# Patient Record
Sex: MALE | Race: WHITE | NOT HISPANIC OR LATINO | Employment: OTHER | URBAN - METROPOLITAN AREA
[De-identification: names, ages, dates, MRNs, and addresses within clinical notes are randomized per-mention and may not be internally consistent; named-entity substitution may affect disease eponyms.]

---

## 2019-01-01 ENCOUNTER — CONVERSION ENCOUNTER (OUTPATIENT)
Dept: OTHER | Facility: HOSPITAL | Age: 59
End: 2019-01-01

## 2019-01-01 VITALS
DIASTOLIC BLOOD PRESSURE: 82 MMHG | SYSTOLIC BLOOD PRESSURE: 137 MMHG | BODY MASS INDEX: 35.03 KG/M2 | OXYGEN SATURATION: 94 % | HEART RATE: 73 BPM | HEIGHT: 66 IN | WEIGHT: 218 LBS

## 2019-01-01 RX ORDER — CLOPIDOGREL BISULFATE 75 MG/1
75 TABLET ORAL DAILY
Qty: 90 TABLET | Refills: 3 | Status: SHIPPED | OUTPATIENT
Start: 2019-01-01

## 2019-06-06 NOTE — PROGRESS NOTES
Visit Type:  Follow-up Visit  Primary Care Provider:  Sarah Juares NP @ [facility]      History of Present Illness:    Dear Sarah,    Mr. Olson is a 59-year-old gentleman with history of severe single-vessel coronary artery disease of the LAD status post PCI with stenting June of 2016 with 70% ostial LAD lesion and FFR negative.  Additional history of hypertension with hypertensive   cardiovascular disease, mild cardiomyopathy with EF of 45% and COPD.    Patient presents today after evaluation for abnormal radiographic findings of right carotid concerning for occlusion that was performed by a dentist in preparation for full dental   extraction and dentures.    Today, the patient reports no complaints of chest pain, pressure or tightness.  His respiratory status is baseline for him.    He denies dizziness, lightheadedness, vision changes, paresis, numbness or tingling in extremities.    Patient has been off   Plavix since June 2017-his last office visit- at his insistence despite my recommendation to continue given his residual cardiac disease.   patient stated he “talk to a doctor at the hospital who said he did not need it”.    This was revisited again today   secondary to concerns now for carotid disease.  I do not have the actual radiology report, only physician report that  states imaging showed possible right carotid occlusion.  EKG shows sinus rhythm with incomplete right bundle branch block -new finding   from prior    Impression  1. Single-vessel coronary artery disease status post PCI with stenting June 2016  2.  Hypertension with hypertensive cardiovascular disease  3. Mild ischemic cardiomyopathy  4.  COPD  5. Anti-platelet therapy  6. abnormal x-ray concerning for right carotid occlusion    Plan   patient agreeable to resume Plavix  Continue aspirin  Will get carotid Dopplers to evaluate for any occlusion  Will get echocardiogram to re-evaluate LV function -last EF 45%  Follow-up after testing      Vital  Signs:    Patient Profile:    59 Years Old Male  Height:     66 inches  Weight:     218 pounds  BMI:        35.18     O2 Sat:     94 %  Inhaled O2: 3 lit/min.  Pulse rate: 73 / minute  Pulse rhythm:   regular  BP Sittin / 82  (left arm)    Cuff size:  large      Problems: Active problems were reviewed with the patient during this visit.  Medications: Medications were reviewed with the patient during this visit.  Allergies: Allergies were reviewed with the patient during this visit.  No Known Allergy.        Vitals Entered By: Lilly Gupta LPN (May 29, 2019 8:25 AM)      Past Medical History:     Reviewed history from 2017 and no changes required:        C O P D , stage 4         Hernia, Ventral         Fatty Neck Tumor        Coronary Artery Disease: s/p PCI     Past Surgical History:     Reviewed history from 2017 and no changes required:        Tonsillectomy        P T C A: x1 2016 [facility]        PCI/Drug-eluting stent: 2016        Umbilical Hernia Repair at [facility] : 2016 by Dr. Werner         Tumor removed from side of neck at [facility] : 2016 by Dr. Werner     Active Medications (reviewed today):  ASPIR-LOW 81 MG ORAL TABLET DELAYED RELEASE (ASPIRIN) Take 1 tablet by mouth daily  INCRUSE ELLIPTA 62.5 MCG/INH INHALATION AEROSOL POWDER BREATH ACTIVATED (UMECLIDINIUM BROMIDE) Take one puff daily  BREO ELLIPTA AEROSOL POWDER BREATH ACTIVATED (FLUTICASONE FUROATE-VILANTEROL AEPB) Take one puff daily  VENTOLIN  (90 Base) MCG/ACT INHALATION AEROSOL SOLUTION (ALBUTEROL SULFATE) every 6 hours or PRN  ISOSORBIDE MONONITRATE ER 30 MG ORAL TABLET EXTENDED RELEASE 24 HOUR (ISOSORBIDE MONONITRATE) Take 1 tablet daily  CLOPIDOGREL BISULFATE 75 MG ORAL TABLET (CLOPIDOGREL BISULFATE) One by mouth daily  ATORVASTATIN CALCIUM 20 MG ORAL TABLET (ATORVASTATIN CALCIUM) p.o.qhs  NITROSTAT 0.4 MG SUBLINGUAL TABLET SUBLINGUAL (NITROGLYCERIN) Dissolve one (1) tablet under the tongue every five minutes as needed  for chest pain. Do Not Exceed 3 tablets.  ZYRTEC ALLERGY 10 MG ORAL TABLET (CETIRIZINE HCL) Take 1 tablet by mouth daily    Current Allergies (reviewed today):  No known allergies    Family History Summary:      Reviewed history Last on 04/12/2017 and no changes required:05/29/2019  Mother - Has Family History of Stroke/CVA - Entered On: 5/29/2019  Father - Has Family History of Lung/Respiratory Disease - COPD - Entered On: 6/23/2016  Mother - Has Family History of Diabetes - Entered On: 6/23/2016  Father - Has Family History of Lung Cancer - Entered On: 6/23/2016  Mother - Has Family History of Other Cancer - Kidney Cancer - Entered On: 6/23/2016  Mother - Has Family History of Kidney Disease - Kidney Cancer - Entered On: 6/23/2016  Father - Has Family History of Heart Disease - Questionable - Entered On: 6/23/2016  Mother - Has Family History of Hypertension - Entered On: 6/23/2016  Mother - Has Family History of Heart Disease - Entered On: 6/23/2016      Social History:     Reviewed history from 04/12/2017 and no changes required:        Passive smoke exposure - no        Alcohol Use - no        Drug Use - no        Regular Exercise - no                Smoking History:        Patient is a former smoker.  (uses e-cigs now)                 Children: 4        Employed: [employer]/ now disabled         Risk Factors:     Smoked Tobacco Use:  Former smoker     Cigarettes:  Yes -- 1 pack(s) per day,      Year quit:  3-4 yrs ago  Smokeless Tobacco Use:  Current       Items -- 1 vial q 2 days per day       Year started:  3-4 yrs ago  Passive smoke exposure:  no  Drug use:  no  HIV high-risk behavior:  no  Caffeine use:  3 drinks per day  Alcohol use:  yes     Type:  occasionally - wine , mixed drink   Exercise:  no  Seatbelt use:  100 %  Sun Exposure:  rarely    Family History Risk Factors:     Family History of MI in females < 65 years old:  no     Family History of MI in males < 55 years old:   no        Review of Systems   General: Per HPI  Eyes: denies blurring, diplopia  Cardiovascular: Single-vessel coronary artery disease status post PCI with stenting 6/16.  Residual 70% ostial lesion.  Mild ischemic cardiomyopathy with EF 45%. concern for carotid artery disease from recent imaging  Respiratory: denies excessive sputum, hemoptysis, wheezing.  COPD.  Reports respiratory status at baseline  Gastrointestinal: Denies nausea vomiting, melena or hematochezia  Genitourinary: Denies hematuria  Musculoskeletal: denies back pain, joint pain, joint swelling, muscle cramps, weakness  Skin: Frequent bruising  Neurologic: denies focal neuro deficits  Endocrine: denies cold intolerance, heat intolerance, significant weight gain or loss  Hematologic/Lymphatic: denies abnormal bleeding, easy bruisability      Physical Exam    General:       well-developed, well-nourished 59-year-old  male in no acute distress  Head:      normocephalic and atraumatic.    Eyes:       sclera clear and nonicteric.  Pupils are equal and reactive  Mouth:       mucous membranes are moist, no lesions  Patient is edentulous  Neck:       no audible bruit  Lungs:       diminished but clear bilaterally.  No wheezes rales or rhonchi  Heart:       normal S1 and S2 with regular rate and rhythm.  No murmurs gallops or rubs  Msk:       ambulate slowly with no assistive devices  Pulses:       2+ radial and pedal pulses bilaterally  Extremities:       no edema  Neurologic:      no focal deficits, cranial nerves II-XII grossly intact with normal sensation, reflexes, coordination, muscle strength and tone.    Skin:       no apparent lesions or rashes  Psych:      alert and cooperative; normal mood and affect; normal attention span and concentration.      Diabetes Management Exam:      Foot Exam (with socks and/or shoes not present):        Pulses:            2+ radial and pedal pulses bilaterally      Blood Pressure:  Today's BP: 137/82 mm  Hg          EKG Interpretation   Rhythm: Normal Sinus  Rate: 73  Comments:  incomplete right bundle branch block  Comparison to Prior EKGs: Different      Medications Added to Medication List This Visit:  1)  Vitamin D3 2000 Unit Oral Capsule (Cholecalciferol) .... Take 1 tablet by mouth daily  2)  Trelegy Ellipta 100-62.5-25 Mcg/inh Inhalation Aerosol Powder Breath Activated (Fluticasone-umeclidin-vilant) .... Daily                    Medication Administration    Orders Added:  1)  EKG (In House) [38526]  2)  Ofc Vst, Est Level IV [62956]  3)  2D ECHO W/COLOR FLOW DOPPLER [CPT-03592]  4)  Bilateral Carotid Doppler [CPT-50723]    ]      Electronically signed by Myriam Hua NP on 05/29/2019 at 9:10 AM  ________________________________________________________________________       Disclaimer: Converted Note message may not contain all data elements that existed in the legacy source system. Please see [facility] [facility] for the original note details.

## 2020-01-01 ENCOUNTER — OFFICE VISIT (OUTPATIENT)
Dept: CARDIOLOGY | Facility: CLINIC | Age: 60
End: 2020-01-01

## 2020-01-01 VITALS
OXYGEN SATURATION: 97 % | SYSTOLIC BLOOD PRESSURE: 161 MMHG | HEIGHT: 66 IN | BODY MASS INDEX: 35.52 KG/M2 | HEART RATE: 73 BPM | WEIGHT: 221 LBS | DIASTOLIC BLOOD PRESSURE: 96 MMHG

## 2020-01-01 DIAGNOSIS — R06.09 DYSPNEA ON EXERTION: ICD-10-CM

## 2020-01-01 DIAGNOSIS — Z79.02 LONG TERM CURRENT USE OF ANTITHROMBOTICS/ANTIPLATELETS: ICD-10-CM

## 2020-01-01 DIAGNOSIS — I25.10 CORONARY ARTERY DISEASE DUE TO LIPID RICH PLAQUE: Primary | ICD-10-CM

## 2020-01-01 DIAGNOSIS — J44.9 CHRONIC OBSTRUCTIVE PULMONARY DISEASE, UNSPECIFIED COPD TYPE (HCC): ICD-10-CM

## 2020-01-01 DIAGNOSIS — E78.5 DYSLIPIDEMIA: ICD-10-CM

## 2020-01-01 DIAGNOSIS — I25.83 CORONARY ARTERY DISEASE DUE TO LIPID RICH PLAQUE: Primary | ICD-10-CM

## 2020-01-01 PROCEDURE — 93000 ELECTROCARDIOGRAM COMPLETE: CPT | Performed by: NURSE PRACTITIONER

## 2020-01-01 PROCEDURE — 99213 OFFICE O/P EST LOW 20 MIN: CPT | Performed by: NURSE PRACTITIONER

## 2020-01-01 RX ORDER — ALBUTEROL SULFATE 90 UG/1
2 AEROSOL, METERED RESPIRATORY (INHALATION) EVERY 6 HOURS PRN
COMMUNITY

## 2020-01-01 RX ORDER — NITROGLYCERIN 0.4 MG/1
0.4 TABLET SUBLINGUAL
COMMUNITY

## 2020-01-01 RX ORDER — ATORVASTATIN CALCIUM 20 MG/1
20 TABLET, FILM COATED ORAL DAILY
COMMUNITY

## 2020-01-01 RX ORDER — ASPIRIN 81 MG/1
81 TABLET ORAL DAILY
COMMUNITY

## 2020-01-01 RX ORDER — CETIRIZINE HYDROCHLORIDE 10 MG/1
10 TABLET ORAL DAILY
COMMUNITY

## 2020-02-04 NOTE — PROGRESS NOTES
[facility] CARDIOLOGY      REASON FOR FOLLOW-UP:  Coronary artery disease  History of PCI  Cardiomyopathy  Hypertension with hypertensive cardiovascular disease            Chief Complaint   Patient presents with   • Coronary Artery Disease     6 month f/u   Chest discomfort unrelated to anything specific   • Hypertension         Dear [facility],    History of Present Illness     Mr. Olson is a 59-year-old gentleman with history of severe single-vessel coronary artery disease of the LAD status post PCI with stenting June of 2016 with 70% ostial LAD lesion and FFR negative.  Additional history of hypertension with hypertensive   cardiovascular disease, mild cardiomyopathy with EF of 45% and COPD.    Patient had abnormal radiographic findings of right carotid concerning for occlusion that was performed by a dentist in preparation for full dental extraction and dentures.  Carotid   Dopplers were performed demonstrating less than 50% stenosis bilaterally.  2D echocardiogram was also ordered with normal LV function, EF 45-50% with mild MR- no change from previous echo.  He presents today to review these diagnostics.     Today, the patient reports that he feels well.    The patient reports occasional episodes of midsternal fullness that is relieved with belching or sipping on soda..  His respiratory status is baseline for him and he is on nasal cannula oxygen today.  He denies dizziness, lightheadedness, vision changes,   paresis, numbness or tingling in extremities.  Patient has continued to take Plavix and was recently started on a statin as well. His blood pressure is elevated today at 161/96, retake 173/94.  Patient states that he checks his pressure frequently at home and it is never elevated.       Impression  1. Single-vessel coronary artery disease status post PCI with stenting June 2016  2. Hypertension with hypertensive cardiovascular disease  3. Mild ischemic cardiomyopathy  4. COPD  5.  Anti-platelet therapy  6. abnormal x-ray concerning for right carotid occlusion     Plan  No change in current medical regimen  Lipids per your office  Continue to monitor blood pressures.  Call our office if systolic readings consistently greater than 150  Follow-up 6 months or sooner if needed.       The following portions of the patient's history were reviewed and updated as appropriate: allergies, current medications, past family history, past medical history, past social history, past surgical history and problem list.    REVIEW OF SYSTEMS:    Review of Systems   Cardiovascular: Positive for dyspnea on exertion.   All other systems reviewed and are negative.      Vitals:    02/05/20 1339   BP: 161/96   Pulse: 73   SpO2: 97%         PHYSICAL EXAM:    General: Alert, cooperative, no distress, appears stated age  Head:  Normocephalic, atraumatic, mucous membranes moist.  Portable oxygen  Eyes:  Conjunctiva/corneas clear, EOM's intact     Neck:  Supple,  no JVD or bruit     Lungs: Very diminished but clear to auscultation bilaterally, no wheezes rhonchi rales are noted  Chest wall: No tenderness  Musculoskeletal:   Ambulates freely without assistance  Heart::  Regular rate and rhythm, S1 and S2 normal, no murmur, rub or gallop  Abdomen: Soft, non-tender, nondistended bowel sounds active, no abdominal bruit  Extremities: No cyanosis, clubbing, or edema   Pulses: 2+ and symmetric all extremities  Skin:  No rashes or lesions  Neuro/psych: A&O x3. CN II through XII are grossly intact with appropriate affect        Past Medical History:   Diagnosis Date   • COPD (chronic obstructive pulmonary disease) (CMS/HCC)     stage IV   • Coronary artery disease 06/2016    single vessel , s/p PCI    • Fatty tumor     neck    • Hypertension    • Incomplete RBBB    • Ischemic cardiomyopathy     mild    • Ventral hernia        Past Surgical History:   Procedure Laterality Date   • CORONARY ANGIOPLASTY WITH STENT PLACEMENT  06/2016     PCI with DEVANG / Ostial LAD    • OTHER SURGICAL HISTORY  11/2016    Tumor removed from side of neck at [facility]    • TONSILLECTOMY     • UMBILICAL HERNIA REPAIR  11/2016    [facility]          Current Outpatient Medications:   •  albuterol sulfate  (90 Base) MCG/ACT inhaler, Inhale 2 puffs Every 6 (Six) Hours As Needed for Wheezing., Disp: , Rfl:   •  aspirin 81 MG EC tablet, Take 81 mg by mouth Daily., Disp: , Rfl:   •  atorvastatin (LIPITOR) 20 MG tablet, Take 20 mg by mouth Daily., Disp: , Rfl:   •  cetirizine (zyrTEC) 10 MG tablet, Take 10 mg by mouth Daily., Disp: , Rfl:   •  clopidogrel (PLAVIX) 75 MG tablet, Take 1 tablet by mouth Daily., Disp: 90 tablet, Rfl: 3  •  nitroglycerin (NITROSTAT) 0.4 MG SL tablet, Place 0.4 mg under the tongue Every 5 (Five) Minutes As Needed for Chest Pain. Take no more than 3 doses in 15 minutes., Disp: , Rfl:     No Known Allergies    Family History   Problem Relation Age of Onset   • Stroke Mother    • Diabetes Mother    • Kidney cancer Mother    • Hypertension Mother    • Heart disease Mother    • COPD Father    • Lung cancer Father    • Heart disease Father        Social History     Tobacco Use   • Smoking status: Former Smoker     Packs/day: 1.00     Types: Cigarettes   • Smokeless tobacco: Current User   Substance Use Topics   • Alcohol use: Not on file           Current Electrocardiogram:    ECG 12 Lead  Date/Time: 2/5/2020 2:42 PM  Performed by: Myriam Hua APRN  Authorized by: Myriam Hua APRN   Comparison: not compared with previous ECG   Rhythm: sinus rhythm  BPM: 70                WANDA Dye  02/05/20  2:43 PM

## 2020-02-05 PROBLEM — I25.10 CORONARY ARTERY DISEASE: Status: ACTIVE | Noted: 2020-01-01

## 2020-02-05 PROBLEM — J44.9 CHRONIC OBSTRUCTIVE PULMONARY DISEASE (HCC): Status: ACTIVE | Noted: 2020-01-01
